# Patient Record
(demographics unavailable — no encounter records)

---

## 2020-04-14 NOTE — EDM.PDOC
ED HPI GENERAL MEDICAL PROBLEM





- General


Chief Complaint: Abdominal Pain


Stated Complaint: POSSIBLE MISCARRIAGE


Time Seen by Provider: 20 17:45


Source of Information: Reports: Patient


History Limitations: Reports: No Limitations





- History of Present Illness


INITIAL COMMENTS - FREE TEXT/NARRATIVE: 


HISTORY AND PHYSICAL:





History of present illness:


Patient is a  30-year-old female who presents to the ED today with concern 

of vaginal bleeding in early pregnancy.  Patient states she just found out she 

was pregnant on Friday and started having bleeding on .  Patient states 

that the bleeding was heaviest yesterday and that she is only used 1 menstrual 

cup today.  Patient states there was a small amount of clotting yesterday but 

has not had any today.  Patient states the blood is bright red.  Patient states 

she is having some right-sided abdominal pain that she describes as "crampy "

and states that the abdominal pain is worse today, although the bleeding is 

better.  Patient states that she has not been seen or evaluated by an OB/GYN 

provider but did call the clinic yesterday and had some lab work done. Patient 

denies any other symptoms or concerns.





Patient denies fever, chills, chest pain, shortness of breath, or cough. Denies 

headache, neck stiff ness, change in vision, syncope, or near syncope. Denies 

nausea, vomiting, diarrhea, constipation, or dysuria. Has not noted any blood 

in urine or stool. Patient has been eating and drinking appropriately.





Review of systems: 


As per history of present illness and below otherwise all systems reviewed and 

negative.





Past medical history: 


As per history of present illness and as reviewed below otherwise 

noncontributory.





Surgical history: 


As per history of present illness and as reviewed below otherwise 

noncontributory.





Social history: 


See social history for further information





Family history: 


As per history of present illness and as reviewed below otherwise 

noncontributory.





Physical exam:


General: Patient is alert, oriented, and in no acute distress. Patient sitting 

comfortably on exam table.


HEENT: Atraumatic, normocephalic, pupils equal and reactive bilaterally, 

negative for conjunctival pallor or scleral icterus, mucous membranes moist, 

TMs normal bilaterally, throat clear, neck supple, nontender, trachea midline. 

No drooling or trismus noted. No meningeal signs. No hot potato voice noted. 


Lungs: Clear to auscultation, breath sounds equal bilaterally, chest nontender.


Heart: S1S2, regular rate and rhythm without overt murmur


Abdomen: Exam of abdomen limited due to body habit us. Otherwise, soft, 

nondistended, nontender. Negative for masses or hepatosplenomegaly. Negative 

for costovertebral tenderness.


Pelvis: Stable nontender.


Genitourinary: Deferred.


Rectal: Deferred.


Skin: Intact, warm, dry. No lesions or rashes noted.


Extremities: Atraumatic, negative for cords or calf pain. Neurovascular 

unremarkable.


Neuro: Awake, alert, oriented. Cranial nerves II through XII unremarkable. 

Cerebellum unremarkable. Motor and sensory unremarkable throughout. Exam 

nonfocal.





Notes:


hcg quant performed yesterday 2020 was 10.


Rh/Blood type is A POSITIVE.


hcg quant today is 9.


Discussed the importance for follow up with OBGYN.


Voices understanding and is agreeable to plan of care. Denies any further 

questions or concerns at this time.





Diagnostics:


UA, CBC, BMP, Blood/RH, TVUS, HCg Quant





Therapeutics:


None





Prescription:


None





Impression: 


Abnormal vaginal bleeding





Plan:


1. Please start and/or continue to take your prenatal vitamin with folic acid 

once daily.


2. Pelvic rest until cleared by your OBGYN (no tampons, sex, etc...)


3. Tylenol as needed for pain management. This is safe to use in pregnancy.


4. Follow up with your OB/GYN as discussed. Return to the ED as needed and as 

discussed.








Definitive disposition and diagnosis as appropriate pending reevaluation and 

review of above.





  ** CRAMPING


Pain Score (Numeric/FACES): 6





- Related Data


 Allergies











Allergy/AdvReac Type Severity Reaction Status Date / Time


 


No Known Allergies Allergy   Verified 20 18:00











Home Meds: 


 Home Meds





Sertraline HCl 50 mg PO DAILY 20 [History]











Past Medical History


OB/GYN History: Reports: Pregnancy, Therapeutic 


Psychiatric History: Reports: Anxiety, Depression





- Infectious Disease History


Infectious Disease History: Reports: Chicken Pox





Social & Family History





- Family History


Family Medical History: Noncontributory





- Tobacco Use


Smoking Status *Q: Never Smoker





ED ROS GENERAL





- Review of Systems


Review Of Systems: Comprehensive ROS is negative, except as noted in HPI.





ED EXAM, GENERAL





- Physical Exam


Exam: See Below (see dictation)





Course





- Vital Signs


Last Recorded V/S: 


 Last Vital Signs











Temp  97.3 F   20 17:57


 


Pulse  78   20 17:57


 


Resp  16   20 17:57


 


BP  149/96 H  20 17:57


 


Pulse Ox  98   20 17:57














- Orders/Labs/Meds


Labs: 


 Laboratory Tests











  20 Range/Units





  17:55 18:27 18:27 


 


WBC   11.26 H   (4.0-11.0)  K/uL


 


RBC   5.03   (4.30-5.90)  M/uL


 


Hgb   14.5   (12.0-16.0)  g/dL


 


Hct   44.1   (36.0-46.0)  %


 


MCV   87.7   (80.0-98.0)  fL


 


MCH   28.8   (27.0-32.0)  pg


 


MCHC   32.9   (31.0-37.0)  g/dL


 


RDW Std Deviation   41.1   (28.0-62.0)  fl


 


RDW Coeff of Sallie   13   (11.0-15.0)  %


 


Plt Count   354   (150-400)  K/uL


 


MPV   10.90   (7.40-12.00)  fL


 


Neut % (Auto)   49.5   (48.0-80.0)  %


 


Lymph % (Auto)   42.9 H   (16.0-40.0)  %


 


Mono % (Auto)   4.9   (0.0-15.0)  %


 


Eos % (Auto)   2.4   (0.0-7.0)  %


 


Baso % (Auto)   0.3   (0.0-1.5)  %


 


Neut # (Auto)   5.6   (1.4-5.7)  K/uL


 


Lymph # (Auto)   4.8 H   (0.6-2.4)  K/uL


 


Mono # (Auto)   0.6   (0.0-0.8)  K/uL


 


Eos # (Auto)   0.3   (0.0-0.7)  K/uL


 


Baso # (Auto)   0.0   (0.0-0.1)  K/uL


 


Nucleated RBC %   0.0   /100WBC


 


Nucleated RBCs #   0   K/uL


 


Sodium    140  (136-145)  mmol/L


 


Potassium    3.8  (3.5-5.1)  mmol/L


 


Chloride    103  ()  mmol/L


 


Carbon Dioxide    25.5  (21.0-32.0)  mmol/L


 


BUN    13  (7.0-18.0)  mg/dL


 


Creatinine    0.7  (0.6-1.0)  mg/dL


 


Est Cr Clr Drug Dosing    84.41  mL/min


 


Estimated GFR (MDRD)    > 60.0  ml/min


 


Glucose    114 H  ()  mg/dL


 


Calcium    9.1  (8.5-10.1)  mg/dL


 


Lipase     ()  U/L


 


HCG, Quant    9.0  mIU/mL


 


Urine Color  YELLOW    


 


Urine Appearance  HAZY    


 


Urine pH  5.5    (5.0-8.0)  


 


Ur Specific Gravity  >= 1.030    (1.001-1.035)  


 


Urine Protein  NEGATIVE    (NEGATIVE)  mg/dL


 


Urine Glucose (UA)  NEGATIVE    (NEGATIVE)  mg/dL


 


Urine Ketones  NEGATIVE    (NEGATIVE)  mg/dL


 


Urine Occult Blood  LARGE H    (NEGATIVE)  


 


Urine Nitrite  NEGATIVE    (NEGATIVE)  


 


Urine Bilirubin  NEGATIVE    (NEGATIVE)  


 


Urine Urobilinogen  0.2    (<2.0)  EU/dL


 


Ur Leukocyte Esterase  NEGATIVE    (NEGATIVE)  


 


Urine RBC  2-6    (0-2/HPF)  


 


Urine WBC  0-3    (0-5/HPF)  


 


Ur Epithelial Cells  OCCASIONAL    (NONE-FEW)  


 


Urine Bacteria  FEW    (NEGATIVE)  


 


Urine Mucus  LIGHT    (NONE-MOD)  


 


Blood Type     














  20 Range/Units





  18:27 18:27 


 


WBC    (4.0-11.0)  K/uL


 


RBC    (4.30-5.90)  M/uL


 


Hgb    (12.0-16.0)  g/dL


 


Hct    (36.0-46.0)  %


 


MCV    (80.0-98.0)  fL


 


MCH    (27.0-32.0)  pg


 


MCHC    (31.0-37.0)  g/dL


 


RDW Std Deviation    (28.0-62.0)  fl


 


RDW Coeff of Sallie    (11.0-15.0)  %


 


Plt Count    (150-400)  K/uL


 


MPV    (7.40-12.00)  fL


 


Neut % (Auto)    (48.0-80.0)  %


 


Lymph % (Auto)    (16.0-40.0)  %


 


Mono % (Auto)    (0.0-15.0)  %


 


Eos % (Auto)    (0.0-7.0)  %


 


Baso % (Auto)    (0.0-1.5)  %


 


Neut # (Auto)    (1.4-5.7)  K/uL


 


Lymph # (Auto)    (0.6-2.4)  K/uL


 


Mono # (Auto)    (0.0-0.8)  K/uL


 


Eos # (Auto)    (0.0-0.7)  K/uL


 


Baso # (Auto)    (0.0-0.1)  K/uL


 


Nucleated RBC %    /100WBC


 


Nucleated RBCs #    K/uL


 


Sodium    (136-145)  mmol/L


 


Potassium    (3.5-5.1)  mmol/L


 


Chloride    ()  mmol/L


 


Carbon Dioxide    (21.0-32.0)  mmol/L


 


BUN    (7.0-18.0)  mg/dL


 


Creatinine    (0.6-1.0)  mg/dL


 


Est Cr Clr Drug Dosing    mL/min


 


Estimated GFR (MDRD)    ml/min


 


Glucose    ()  mg/dL


 


Calcium    (8.5-10.1)  mg/dL


 


Lipase   200  ()  U/L


 


HCG, Quant    mIU/mL


 


Urine Color    


 


Urine Appearance    


 


Urine pH    (5.0-8.0)  


 


Ur Specific Gravity    (1.001-1.035)  


 


Urine Protein    (NEGATIVE)  mg/dL


 


Urine Glucose (UA)    (NEGATIVE)  mg/dL


 


Urine Ketones    (NEGATIVE)  mg/dL


 


Urine Occult Blood    (NEGATIVE)  


 


Urine Nitrite    (NEGATIVE)  


 


Urine Bilirubin    (NEGATIVE)  


 


Urine Urobilinogen    (<2.0)  EU/dL


 


Ur Leukocyte Esterase    (NEGATIVE)  


 


Urine RBC    (0-2/HPF)  


 


Urine WBC    (0-5/HPF)  


 


Ur Epithelial Cells    (NONE-FEW)  


 


Urine Bacteria    (NEGATIVE)  


 


Urine Mucus    (NONE-MOD)  


 


Blood Type  A POSITIVE   














Departure





- Departure


Time of Disposition: 19:55


Disposition: Home, Self-Care 01


Clinical Impression: 


 Abnormal vaginal bleeding








- Discharge Information


Referrals: 


Milvia Bartholomew NP [Primary Care Provider] - 


Forms:  ED Department Discharge


Additional Instructions: 


The following information is given to patients seen in the emergency department 

who are being discharged to home. This information is to outline your options 

for follow-up care. We provide all patients seen in our emergency department 

with a follow-up referral.





The need for follow-up, as well as the timing and circumstances, are variable 

depending upon the specifics of your emergency department visit.





If you don't have a primary care physician on staff, we will provide you with a 

referral. We always advise you to contact your personal physician following an 

emergency department visit to inform them of the circumstance of the visit and 

for follow-up with them and/or the need for any referrals to a consulting 

specialist.





The emergency department will also refer you to a specialist when appropriate. 

This referral assures that you have the opportunity for follow-up care with a 

specialist. All of these measure are taken in an effort to provide you with 

optimal care, which includes your follow-up.





Under all circumstances we always encourage you to contact your private 

physician who remains a resource for coordinating your care. When calling for 

follow-up care, please make the office aware that this follow-up is from your 

recent emergency room visit. If for any reason you are refused follow-up, 

please contact the [facility] Emergency 

Department at [phone] and asked to speak to the emergency department 

charge nurse.





[facility]


Primary Care


[address] [place] [id number]


Phone: [phone]


Fax: [phone]





[place] [address]


Phone: [phone]


Fax: [phone]





1. Please start and/or continue to take your prenatal vitamin with folic acid 

once daily.


2. Pelvic rest until cleared by your OBGYN (no tampons, sex, etc...)


3. Tylenol as needed for pain management. This is safe to use in pregnancy.


4. Follow up with your OB/GYN as discussed. Return to the ED as needed and as 

discussed.





 





 











Sepsis Event Note





- Evaluation


Sepsis Screening Result: No Definite Risk





- Focused Exam


Vital Signs: 


 Vital Signs











  Temp Pulse Resp BP Pulse Ox


 


 20 17:57  97.3 F  78  16  149/96 H  98











Date Exam was Performed: 20


Time Exam was Performed: 19:53

## 2020-04-14 NOTE — US
First trimester obstetrical ultrasound: Multiple real-time images were

 obtained transvaginally.

 

No intrauterine gestational sac is seen.  Endometrial thickness is 

normal at 3.4 mm.  No myometrial abnormality is seen.  Follicles are 

seen within the ovaries.  No adnexal abnormalities or free fluid is 

seen.

 

Impression:

1.  No intrauterine gestational sac or adnexal abnormalities are seen.

 

Note: With positive pregnancy test, findings could represent 

miscarriage, pregnancy too early to visualize or less likely 

nonvisualized ectopic pregnancy.

 

Diagnostic code #1

 

Study was dictated in MDT